# Patient Record
Sex: FEMALE | Race: WHITE | NOT HISPANIC OR LATINO | Employment: FULL TIME | URBAN - METROPOLITAN AREA
[De-identification: names, ages, dates, MRNs, and addresses within clinical notes are randomized per-mention and may not be internally consistent; named-entity substitution may affect disease eponyms.]

---

## 2019-02-01 ENCOUNTER — OFFICE VISIT (OUTPATIENT)
Dept: URGENT CARE | Facility: CLINIC | Age: 59
End: 2019-02-01
Payer: MEDICAID

## 2019-02-01 VITALS
BODY MASS INDEX: 26.66 KG/M2 | SYSTOLIC BLOOD PRESSURE: 99 MMHG | HEART RATE: 67 BPM | WEIGHT: 160 LBS | RESPIRATION RATE: 20 BRPM | TEMPERATURE: 99 F | HEIGHT: 65 IN | DIASTOLIC BLOOD PRESSURE: 56 MMHG | OXYGEN SATURATION: 98 %

## 2019-02-01 DIAGNOSIS — S29.012A UPPER BACK STRAIN, INITIAL ENCOUNTER: Primary | ICD-10-CM

## 2019-02-01 PROCEDURE — 99203 OFFICE O/P NEW LOW 30 MIN: CPT | Mod: S$GLB,,, | Performed by: FAMILY MEDICINE

## 2019-02-01 PROCEDURE — 99203 PR OFFICE/OUTPT VISIT, NEW, LEVL III, 30-44 MIN: ICD-10-PCS | Mod: S$GLB,,, | Performed by: FAMILY MEDICINE

## 2019-02-01 RX ORDER — LEVOTHYROXINE SODIUM 100 UG/1
TABLET ORAL
Refills: 2 | COMMUNITY
Start: 2019-01-02

## 2019-02-01 RX ORDER — ATENOLOL 25 MG/1
TABLET ORAL
Refills: 5 | COMMUNITY
Start: 2019-01-25

## 2019-02-01 RX ORDER — SERTRALINE HYDROCHLORIDE 100 MG/1
TABLET, FILM COATED ORAL
Refills: 2 | COMMUNITY
Start: 2019-01-04

## 2019-02-01 RX ORDER — LEVOTHYROXINE SODIUM 75 UG/1
TABLET ORAL
Refills: 5 | COMMUNITY
Start: 2019-01-02 | End: 2019-02-01

## 2019-02-01 RX ORDER — CYCLOBENZAPRINE HCL 10 MG
TABLET ORAL
Qty: 30 TABLET | Refills: 0 | Status: SHIPPED | OUTPATIENT
Start: 2019-02-01

## 2019-02-01 NOTE — PATIENT INSTRUCTIONS
Back Sprain or Strain    Injury to the muscles (strain) or ligaments (sprain) around the spine can be troubling. Injury may occur after a sudden forceful twisting or bending force such as in a car accident, after a simple awkward movement, or after lifting something heavy with poor body positioning. In any case, muscle spasm is often present and adds to the pain.  Thankfully, most people feel better in 1 to 2 weeks, and most of the rest in 1 to 2 months. Most people can remain active. Unless you had a forceful or traumatic physical injury such as a car accident or fall, X-rays may not be ordered for the first evaluation of a back sprain or strain. If pain continues and does not respond to medical treatment, your healthcare provider may then order X-rays and other tests.  Home care  The following guidelines will help you care for your injury at home:  · When in bed, try to find a comfortable position. A firm mattress is best. Try lying flat on your back with pillows under your knees. You can also try lying on your side with your knees bent up toward your chest and a pillow between your knees.  · Don't sit for long periods. Try not to take long car rides or take other trips that have you sitting for a long time. This puts more stress on the lower back than standing or walking.  · During the first 24 to 72 hours after an injury or flare-up, apply an ice pack to the painful area for 20 minutes. Then remove it for 20 minutes. Do this for 60 to 90 minutes, or several times a day. This will reduce swelling and pain. Be sure to wrap the ice pack in a thin towel or plastic to protect your skin.  · You can start with ice, then switch to heat. Heat from a hot shower, hot bath, or heating pad reduces pain and works well for muscle spasms. Put heat on the painful area for 20 minutes, then remove for 20 minutes. Do this for 60 to 90 minutes, or several times a day. Do not use a heating pad while sleeping. It can burn the  skin.  · You can alternate the ice and heat. Talk with your healthcare provider to find out the best treatment or therapy for your back pain.  · Therapeutic massage will help relax the back muscles without stretching them.  · Be aware of safe lifting methods. Do not lift anything over 15 pounds until all of the pain is gone.  Medicines  Talk to your healthcare provider before using medicines, especially if you have other health problems or are taking other medicines.  · You may use acetaminophen or ibuprofen to control pain, unless another pain medicine was prescribed. If you have chronic conditions like diabetes, liver or kidney disease, stomach ulcers, or gastrointestinal bleeding, or are taking blood-thinner medicines, talk with your doctor before taking any medicines.  · Be careful if you are given prescription medicines, narcotics, or medicine for muscle spasm. They can cause drowsiness, and affect your coordination, reflexes, and judgment. Do not drive or operate heavy machinery when taking these types of medicines. Only take pain medicine as prescribed by your healthcare provider.  Follow-up care  Follow up with your healthcare provider, or as advised. You may need physical therapy or more tests if your symptoms get worse.  If you had X-rays your healthcare provider may be checking for any broken bones, breaks, or fractures. Bruises and sprains can sometimes hurt as much as a fracture. These injuries can take time to heal completely. If your symptoms dont improve or they get worse, talk with your healthcare provider. You may need a repeat X-ray or other tests.  Call 911  Call for emergency care if any of the following occur:  · Trouble breathing  · Confused  · Very drowsy or trouble awakening  · Fainting or loss of consciousness  · Rapid or very slow heart rate  · Loss of bowel or bladder control  When to seek medical advice  Call your healthcare provider right away if any of the following occur:  · Pain  gets worse or spreads to your arms or legs  · Weakness or numbness in one or both arms or legs  · Numbness in the groin or genital area  Date Last Reviewed: 6/1/2016 © 2000-2017 [employer] [facility] [employer]. [address] [place] [address], [place] [address]. All rights reserved. This information is not intended as a substitute for professional medical care. Always follow your healthcare professional's instructions.      TRY IBUPROFEN 400-600 MG 3-4 TIMES DAILY WITH FOOD.    BE AWARE THAT THE MUSCLE RELAXANT, FLEXERIL, CAN MAKE YOU SLEEPY, SO DO NOT TAKE BEFORE DRIVING OR OPERATING HEAVY MACHINERY.    AVOID AGGRAVATING ACTIVITIES.    Make sure that you follow up with your primary care doctor in the next 2-5 days if needed .  Return to the Urgent Care if signs or symptoms change and certainly if you have worsening symptoms go to the nearest emergency department for further evaluation.

## 2020-06-19 ENCOUNTER — OFFICE VISIT (OUTPATIENT)
Dept: URGENT CARE | Facility: CLINIC | Age: 60
End: 2020-06-19
Payer: MEDICAID

## 2020-06-19 VITALS
DIASTOLIC BLOOD PRESSURE: 75 MMHG | OXYGEN SATURATION: 97 % | TEMPERATURE: 97 F | SYSTOLIC BLOOD PRESSURE: 118 MMHG | HEART RATE: 60 BPM

## 2020-06-19 DIAGNOSIS — S61.211A LACERATION OF LEFT INDEX FINGER WITHOUT FOREIGN BODY WITHOUT DAMAGE TO NAIL, INITIAL ENCOUNTER: Primary | ICD-10-CM

## 2020-06-19 PROCEDURE — 99213 OFFICE O/P EST LOW 20 MIN: CPT | Mod: 25,S$GLB,, | Performed by: PHYSICIAN ASSISTANT

## 2020-06-19 PROCEDURE — 90471 IMMUNIZATION ADMIN: CPT | Mod: S$GLB,VFC,, | Performed by: PHYSICIAN ASSISTANT

## 2020-06-19 PROCEDURE — 99213 PR OFFICE/OUTPT VISIT, EST, LEVL III, 20-29 MIN: ICD-10-PCS | Mod: 25,S$GLB,, | Performed by: PHYSICIAN ASSISTANT

## 2020-06-19 PROCEDURE — 90471 TDAP VACCINE GREATER THAN OR EQUAL TO 7YO IM: ICD-10-PCS | Mod: S$GLB,VFC,, | Performed by: PHYSICIAN ASSISTANT

## 2020-06-19 PROCEDURE — 90715 TDAP VACCINE GREATER THAN OR EQUAL TO 7YO IM: ICD-10-PCS | Mod: SL,S$GLB,, | Performed by: PHYSICIAN ASSISTANT

## 2020-06-19 PROCEDURE — 90715 TDAP VACCINE 7 YRS/> IM: CPT | Mod: SL,S$GLB,, | Performed by: PHYSICIAN ASSISTANT

## 2020-06-19 RX ORDER — MUPIROCIN 20 MG/G
OINTMENT TOPICAL 3 TIMES DAILY
Qty: 1 TUBE | Refills: 0 | Status: SHIPPED | OUTPATIENT
Start: 2020-06-19 | End: 2020-06-26

## 2020-06-19 RX ORDER — ACYCLOVIR 400 MG/1
TABLET ORAL
COMMUNITY
Start: 2020-06-07

## 2020-06-19 RX ORDER — CLINDAMYCIN HYDROCHLORIDE 300 MG/1
300 CAPSULE ORAL EVERY 8 HOURS
Qty: 9 CAPSULE | Refills: 0 | Status: SHIPPED | OUTPATIENT
Start: 2020-06-19 | End: 2020-06-22

## 2020-06-19 RX ORDER — DICYCLOMINE HYDROCHLORIDE 20 MG/1
TABLET ORAL
COMMUNITY
Start: 2020-06-05

## 2020-06-19 NOTE — PROGRESS NOTES
Subjective:       Patient ID: May Tomlinson is a 60 y.o. female.    Vitals:  temperature is 97.4 °F (36.3 °C). Her blood pressure is 118/75 and her pulse is 60. Her oxygen saturation is 97%.     Chief Complaint: Laceration    60-year-old female who presents to urgent care clinic for evaluation.  Left distal index Finger cut on yesterday with  around 3-4 p.m..  She cleaned it and managed it.  Bleeding stopped overnight.  There is residual throbbing/tingling pain but no active bleeding.  Denies any focal weakness, deficits, abnormal ROM, or fever, chills, or active drainage.  Unsure of tetanus status.    Laceration   The incident occurred 12 to 24 hours ago. The laceration is located on the left hand. The laceration is 1 cm in size. The laceration mechanism was a metal edge. The pain is at a severity of 3/10. The pain is mild. The pain has been intermittent since onset. Possible foreign bodies include metal. Her tetanus status is unknown.       Constitution: Negative for activity change, appetite change, chills, sweating, fatigue, fever and generalized weakness.   HENT: Negative for ear pain, hearing loss, facial swelling, facial trauma, congestion, postnasal drip, sinus pain, sinus pressure, sore throat, trouble swallowing and voice change.    Neck: Negative for neck pain, neck stiffness and painful lymph nodes.   Cardiovascular: Negative for chest trauma, chest pain, leg swelling, palpitations, sob on exertion and passing out.   Eyes: Negative for eye trauma, eye discharge, eye pain, photophobia, vision loss, double vision and blurred vision.   Respiratory: Negative for chest tightness, cough, sputum production, bloody sputum, COPD, shortness of breath, stridor, wheezing and asthma.    Gastrointestinal: Negative for abdominal trauma, abdominal pain, nausea, vomiting, constipation, diarrhea, bright red blood in stool, rectal bleeding, heartburn and bowel incontinence.   Genitourinary: Negative for  dysuria, frequency, urgency, urine decreased, flank pain, bladder incontinence, hematuria, missed menses, genital trauma and pelvic pain.   Musculoskeletal: Positive for pain. Negative for trauma, joint pain, joint swelling, abnormal ROM of joint, muscle cramps and muscle ache.   Skin: Positive for laceration. Negative for color change, pale, rash, wound, abrasion and bruising.   Allergic/Immunologic: Negative for seasonal allergies, asthma and immunocompromised state.   Neurological: Negative for dizziness, history of vertigo, light-headedness, passing out, facial drooping, speech difficulty, coordination disturbances, loss of balance, headaches, disorientation, altered mental status, loss of consciousness, numbness, tingling and seizures.   Hematologic/Lymphatic: Negative for swollen lymph nodes, easy bruising/bleeding, trouble clotting and history of bleeding disorder. Does not bruise/bleed easily.   Psychiatric/Behavioral: Negative for altered mental status and disorientation.       Objective:      Physical Exam   Constitutional: She is oriented to person, place, and time. She appears well-developed. She is cooperative.  Non-toxic appearance. No distress.   HENT:   Head: Normocephalic and atraumatic.   Right Ear: Hearing, external ear and ear canal normal.   Left Ear: Hearing, external ear and ear canal normal.   Nose: Nose normal.   Mouth/Throat: Uvula is midline, oropharynx is clear and moist and mucous membranes are normal. No posterior oropharyngeal edema or posterior oropharyngeal erythema. No tonsillar exudate.   Eyes: Pupils are equal, round, and reactive to light. Conjunctivae, EOM and lids are normal. Right eye exhibits no discharge. Left eye exhibits no discharge.   Neck: Normal range of motion and full passive range of motion without pain. Neck supple. No neck rigidity.   Cardiovascular: Normal rate, regular rhythm and normal pulses.   Pulmonary/Chest: Effort normal and breath sounds normal. No  accessory muscle usage. No respiratory distress.   Abdominal: Soft. Bowel sounds are normal. She exhibits no distension. There is no abdominal tenderness.   Musculoskeletal: Normal range of motion.      Right lower leg: No edema.      Left lower leg: No edema.      Comments: Moves all extremities with normal tone, strength, and ROM.   Neurological: She is alert and oriented to person, place, and time. She has normal motor skills, normal sensation and intact cranial nerves. She displays no weakness, facial symmetry and normal reflexes. No cranial nerve deficit or sensory deficit. She exhibits normal muscle tone. Gait and coordination normal. Coordination normal.   Skin: Skin is warm, dry and not diaphoretic. Capillary refill takes less than 2 seconds.   Superficial jagged laceration to distal left index finger.  No active bleeding, drainage, or tenderness to palpation.    Comments: Superficial jagged laceration to distal left index finger.  No active bleeding, drainage, or tenderness to palpation.     Psychiatric: Her behavior is normal. Thought content normal.   Nursing note and vitals reviewed.                Assessment:       1. Laceration of left index finger without foreign body without damage to nail, initial encounter          Patient presents with clinical exam findings and history consistent with superficial laceration.  No closure needed.  Patient was given tetanus shot in clinic.  Patient was topical Bactroban, prophylactic clindamycin (allergic to penicillin), and recommended OTC treatments for their symptoms.   strict wound care given verbally and printed.    Patient was instructed to return for re-evaluation for any worsening or change in current symptoms. Strict ED versus clinic precautions given in depth. Discharge and follow-up instructions given verbally/printed with the patient who expressed understanding and willingness to comply with my recommendations.  Patient verbalized understanding and  agreed with the entirety of plan of care.    Plan:         Laceration of left index finger without foreign body without damage to nail, initial encounter  -     (In Office Administered) Tdap Vaccine  -     clindamycin (CLEOCIN) 300 MG capsule; Take 1 capsule (300 mg total) by mouth every 8 (eight) hours. for 3 days  Dispense: 9 capsule; Refill: 0  -     mupirocin (BACTROBAN) 2 % ointment; Apply topically 3 (three) times daily. for 7 days  Dispense: 1 Tube; Refill: 0           Patient Instructions   PLEASE READ YOUR DISCHARGE INSTRUCTIONS ENTIRELY AS IT CONTAINS IMPORTANT INFORMATION.    Please take the antibiotics to completion. Please supplement with OTC probiotics and yogurt.     Use the antibiotic ointment to the wound 2-3x daily as directed.     Please cover your incision for 48-72 hours.  Then keep open to air.    Please return or see your primary care doctor for signs of worsening infection (fever, worsening swelling/redness/pain, inability to move your extremity).        Please arrange follow up with your primary medical clinic as soon as possible. You must understand that you've received an Urgent Care treatment only and that you may be released before all of your medical problems are known or treated. You, the patient, will arrange for follow up as instructed. If your symptoms worsen or fail to improve you should go to the Emergency Room.    WE CANNOT RULE OUT ALL POSSIBLE CAUSES OF YOUR SYMPTOMS IN THE URGENT CARE SETTING PLEASE GO TO THE ER IF YOU FEELS YOUR CONDITION IS WORSENING OR YOU WOULD LIKE EMERGENT EVALUATION.

## 2020-06-19 NOTE — PATIENT INSTRUCTIONS
PLEASE READ YOUR DISCHARGE INSTRUCTIONS ENTIRELY AS IT CONTAINS IMPORTANT INFORMATION.    Please take the antibiotics to completion. Please supplement with OTC probiotics and yogurt.     Use the antibiotic ointment to the wound 2-3x daily as directed.     Please cover your incision for 48-72 hours.  Then keep open to air.    Please return or see your primary care doctor for signs of worsening infection (fever, worsening swelling/redness/pain, inability to move your extremity).        Please arrange follow up with your primary medical clinic as soon as possible. You must understand that you've received an Urgent Care treatment only and that you may be released before all of your medical problems are known or treated. You, the patient, will arrange for follow up as instructed. If your symptoms worsen or fail to improve you should go to the Emergency Room.    WE CANNOT RULE OUT ALL POSSIBLE CAUSES OF YOUR SYMPTOMS IN THE URGENT CARE SETTING PLEASE GO TO THE ER IF YOU FEELS YOUR CONDITION IS WORSENING OR YOU WOULD LIKE EMERGENT EVALUATION.

## 2024-02-14 ENCOUNTER — HOSPITAL ENCOUNTER (EMERGENCY)
Facility: HOSPITAL | Age: 64
Discharge: HOME | End: 2024-02-14
Attending: EMERGENCY MEDICINE
Payer: MEDICAID

## 2024-02-14 VITALS
DIASTOLIC BLOOD PRESSURE: 60 MMHG | SYSTOLIC BLOOD PRESSURE: 124 MMHG | BODY MASS INDEX: 23.32 KG/M2 | TEMPERATURE: 98 F | OXYGEN SATURATION: 98 % | WEIGHT: 140 LBS | HEIGHT: 65 IN | HEART RATE: 70 BPM | RESPIRATION RATE: 18 BRPM

## 2024-02-14 DIAGNOSIS — U07.1 COVID-19: Primary | ICD-10-CM

## 2024-02-14 LAB
ALBUMIN SERPL-MCNC: 4 G/DL (ref 3.3–5.5)
ALP SERPL-CCNC: 80 U/L (ref 42–141)
BILIRUB SERPL-MCNC: 0.6 MG/DL (ref 0.2–1.6)
BUN SERPL-MCNC: 10 MG/DL (ref 7–22)
CALCIUM SERPL-MCNC: 9.6 MG/DL (ref 8–10.3)
CHLORIDE SERPL-SCNC: 109 MMOL/L (ref 98–108)
CREAT SERPL-MCNC: 0.6 MG/DL (ref 0.6–1.2)
CTP QC/QA: YES
GLUCOSE SERPL-MCNC: 131 MG/DL (ref 73–118)
HCT, POC: NORMAL
HGB, POC: NORMAL (ref 14–18)
INFLUENZA A ANTIGEN, POC: NEGATIVE
INFLUENZA B ANTIGEN, POC: NEGATIVE
MCH, POC: NORMAL
MCHC, POC: NORMAL
MCV, POC: NORMAL
MPV, POC: NORMAL
POC ALT (SGPT): 15 U/L (ref 10–47)
POC AST (SGOT): 26 U/L (ref 11–38)
POC PLATELET COUNT: NORMAL
POC TCO2: 24 MMOL/L (ref 18–33)
POTASSIUM BLD-SCNC: 3.6 MMOL/L (ref 3.6–5.1)
PROTEIN, POC: 7.6 G/DL (ref 6.4–8.1)
RBC, POC: NORMAL
RDW, POC: NORMAL
SARS-COV-2 RDRP RESP QL NAA+PROBE: POSITIVE
SODIUM BLD-SCNC: 144 MMOL/L (ref 128–145)
WBC, POC: NORMAL

## 2024-02-14 PROCEDURE — 96375 TX/PRO/DX INJ NEW DRUG ADDON: CPT | Mod: ER

## 2024-02-14 PROCEDURE — 85025 COMPLETE CBC W/AUTO DIFF WBC: CPT | Mod: ER

## 2024-02-14 PROCEDURE — 87635 SARS-COV-2 COVID-19 AMP PRB: CPT | Mod: ER | Performed by: EMERGENCY MEDICINE

## 2024-02-14 PROCEDURE — 99284 EMERGENCY DEPT VISIT MOD MDM: CPT | Mod: 25,ER

## 2024-02-14 PROCEDURE — 96374 THER/PROPH/DIAG INJ IV PUSH: CPT | Mod: ER

## 2024-02-14 PROCEDURE — 63600175 PHARM REV CODE 636 W HCPCS: Mod: ER | Performed by: NURSE PRACTITIONER

## 2024-02-14 PROCEDURE — 87804 INFLUENZA ASSAY W/OPTIC: CPT | Mod: 59,ER

## 2024-02-14 PROCEDURE — 25000003 PHARM REV CODE 250: Mod: ER | Performed by: NURSE PRACTITIONER

## 2024-02-14 PROCEDURE — 96361 HYDRATE IV INFUSION ADD-ON: CPT | Mod: ER

## 2024-02-14 PROCEDURE — 80053 COMPREHEN METABOLIC PANEL: CPT | Mod: ER

## 2024-02-14 RX ORDER — ACETAMINOPHEN 500 MG
1000 TABLET ORAL EVERY 6 HOURS PRN
Qty: 20 TABLET | Refills: 0 | Status: SHIPPED | OUTPATIENT
Start: 2024-02-14

## 2024-02-14 RX ORDER — ONDANSETRON 4 MG/1
4 TABLET, ORALLY DISINTEGRATING ORAL EVERY 6 HOURS PRN
Qty: 10 TABLET | Refills: 0 | Status: SHIPPED | OUTPATIENT
Start: 2024-02-14

## 2024-02-14 RX ORDER — IBUPROFEN 600 MG/1
600 TABLET ORAL EVERY 6 HOURS PRN
Qty: 20 TABLET | Refills: 0 | Status: SHIPPED | OUTPATIENT
Start: 2024-02-14

## 2024-02-14 RX ORDER — ONDANSETRON HYDROCHLORIDE 2 MG/ML
4 INJECTION, SOLUTION INTRAVENOUS
Status: COMPLETED | OUTPATIENT
Start: 2024-02-14 | End: 2024-02-14

## 2024-02-14 RX ORDER — BENZONATATE 100 MG/1
100 CAPSULE ORAL 3 TIMES DAILY PRN
Qty: 20 CAPSULE | Refills: 0 | Status: SHIPPED | OUTPATIENT
Start: 2024-02-14 | End: 2024-02-24

## 2024-02-14 RX ORDER — KETOROLAC TROMETHAMINE 30 MG/ML
15 INJECTION, SOLUTION INTRAMUSCULAR; INTRAVENOUS
Status: COMPLETED | OUTPATIENT
Start: 2024-02-14 | End: 2024-02-14

## 2024-02-14 RX ADMIN — SODIUM CHLORIDE 1000 ML: 9 INJECTION, SOLUTION INTRAVENOUS at 01:02

## 2024-02-14 RX ADMIN — KETOROLAC TROMETHAMINE 15 MG: 30 INJECTION, SOLUTION INTRAMUSCULAR; INTRAVENOUS at 01:02

## 2024-02-14 RX ADMIN — ONDANSETRON 4 MG: 2 INJECTION INTRAMUSCULAR; INTRAVENOUS at 01:02

## 2024-02-14 NOTE — DISCHARGE INSTRUCTIONS
Thank you for coming to our Emergency Department today. It is important to remember that some problems or medical conditions are difficult to diagnose and may not be found during your Emergency Department visit.     Be sure to follow up with your primary care doctor and review all labs/imaging/tests that were performed during your ER visit with them. Some labs/tests may be outside of the normal range and require non-emergent follow-up and further investigation to help diagnose/exclude/prevent complications or other potentially serious medical conditions that were not addressed during your ER visit.    If you do not have a primary care doctor, you may contact the one listed on your discharge paperwork or you may also call the [facility] Appointment Desk at [phone] to schedule an appointment and establish care with one. It is important to your health that you have a primary care doctor.    Please take all medications as directed. All medications may potentially have side-effects and it is impossible to predict which medications may give you side-effects or what side-effects (if any) they will give you.. If you feel that you are having a negative effect or side-effect of any medication you should immediately stop taking them and seek medical attention. If you feel that you are having a life-threatening reaction call 911.    Return to the ER with any questions/concerns, new/concerning symptoms, worsening or failure to improve.     Do not drive, swim, climb to height, take a bath, operate heavy machinery, drink alcohol or take potentially sedating medications, sign any legal documents or make any important decisions for 24 hours if you have received any pain medications, sedatives or mood altering drugs during your ER visit or within 24 hours of taking them if they have been prescribed to you.     You can find additional resources for Dentists, hearing aids, durable medical equipment, low cost pharmacies and  other resources at [email or web address]    BELOW THIS LINE ONLY APPLIES IF YOU HAVE A COVID TEST PENDING OR IF YOU HAVE BEEN DIAGNOSED WITH COVID:  Please access [facility] to review the results of your test. Until the results of your COVID test return, you should isolate yourself so as not to potentially spread illness to others.   If your COVID test returns positive, you should isolate yourself so as not to spread illness to others. After five full days, if you are feeling better and you have not had fever for 24 hours, you can return to your typical daily activities, but you must wear a mask around others for an additional 5 days.   If your COVID test returns negative and you are either unvaccinated or more than six months out from your two-dose vaccine and are not yet boosted, you should still quarantine for 5 full days followed by strict mask use for an additional 5 full days.   If your COVID test returns negative and you have received your 2-dose initial vaccine as well as a booster, you should continue strict mask use for 10 full days after the exposure.  For all those exposed, best practice includes a test at day 5 after the exposure. This can be a home test or a test through one of the many testing centers throughout our community.   Masking is always advised to limit the spread of COVID. Cdc.gov is an excellent site to obtain the latest up to date recommendations regarding COVID and COVID testing.     CDC Testing and Quarantine Guidelines for patients with exposure to a known-positive COVID-19 person:  A close exposure is defined as anyone who has had an exposure (masked or unmasked) to a known COVID -19 positive person within 6 feet of someone for a cumulative total of 15 minutes or more over a 24-hour period.   Vaccinated and/or if you recently had a positive covid test within 90 days do NOT need to quarantine after contact with someone who had COVID-19 unless you develop symptoms.   Fully vaccinated  people who have not had a positive test within 90 days, should get tested 3-5 days after their exposure, even if they don't have symptoms and wear a mask indoors in public for 14 days following exposure or until their test result is negative.      Unvaccinated and/or NOT had a positive test within 90 days and meet close exposure  You are required by CDC guidelines to quarantine for at least 5 days from time of exposure followed by 5 days of strict masking. It is recommended, but not required to test after 5 days, unless you develop symptoms, in which case you should test at that time.  If you get tested after 5 days and your test is positive, your 5 day period of isolation starts the day of the positive test.    If your exposure does not meet the above definition, you can return to your normal daily activities to include social distancing, wearing a mask and frequent handwashing.      Here is a link to guidance from the CDC:  https://www.cdc.gov/media/releases/2021/s1227-isolation-quarantine-guidance.html      [place] Dept Of Health Testing Sites:  https://ldh.la.gov/page/3934      [facility] website with testing locations and guidance:  [email or web address]

## 2024-02-14 NOTE — ED PROVIDER NOTES
Encounter Date: 2/14/2024    SCRIBE #1 NOTE: I, Kim De La Cruz, am scribing for, and in the presence of,  Kelsea España NP. I have scribed the following portions of the note - Other sections scribed: HPI,YAHAIRA.       History     Chief Complaint   Patient presents with    URI     Pt complains of headache, generalized body aches, and fever since Sunday. Denies taking otc medications. Adds nausea and 1 episode of diarrhea.      May Tomlinson is a 63 y.o. female, with no pertinent PMHx , who presents to the ED with worsening generalized myalgias that began 3 days ago . Patient reports associated symptoms of headache, nausea, subjective fever, lightheadedness, weakness, and diarrhea (1x this AM). Patient attempted treatment with tylenol but symptoms persisted. No other exacerbating or alleviating factors. Patient states she ate chicken broth today but hasn't intake much fluids recently. Patient denies any sick contacts. Denies cough, congestion, rhinorrhea, sore throat, chest pain, abdominal pain, vomiting, dysuria, urinary/bowel incontinence, or other associated symptoms.       The history is provided by the patient. No  was used.     Review of patient's allergies indicates:   Allergen Reactions    Menthol contain prod Hives    Penicillins Rash     Had childhood reaction    Tramadol Nausea And Vomiting     No past medical history on file.  No past surgical history on file.  No family history on file.  Social History     Tobacco Use    Smoking status: Never     Review of Systems   Constitutional:  Positive for fever (subjective). Negative for chills.   HENT:  Negative for congestion and sore throat.    Eyes:  Negative for pain.   Respiratory:  Negative for cough, shortness of breath and wheezing.    Cardiovascular:  Negative for chest pain.   Gastrointestinal:  Positive for diarrhea and nausea. Negative for abdominal pain and vomiting.   Genitourinary:  Negative for dysuria and flank pain.         - urinary/bowel incontinence    Musculoskeletal:  Negative for myalgias (generalized).   Skin:  Negative for color change.   Neurological:  Positive for weakness, light-headedness and headaches.   Hematological:  Does not bruise/bleed easily.   Psychiatric/Behavioral:  Negative for suicidal ideas.        Physical Exam     Initial Vitals [02/14/24 1155]   BP Pulse Resp Temp SpO2   (!) 107/52 85 18 98.2 °F (36.8 °C) 99 %      MAP       --         Physical Exam    Constitutional: She appears well-developed and well-nourished. She is not diaphoretic. No distress.   HENT:   Head: Normocephalic and atraumatic.   Right Ear: External ear normal.   Left Ear: External ear normal.   Eyes: Conjunctivae and EOM are normal. Pupils are equal, round, and reactive to light.   Neck:   Normal range of motion.  Cardiovascular:  Normal rate, regular rhythm, normal heart sounds and intact distal pulses.           Pulmonary/Chest: No respiratory distress.   Abdominal: Abdomen is soft. There is no abdominal tenderness.   Musculoskeletal:         General: Normal range of motion.      Cervical back: Normal range of motion.     Neurological: She is alert and oriented to person, place, and time.   Skin: Skin is warm and dry.   Psychiatric: She has a normal mood and affect. Her behavior is normal.         ED Course   Procedures  Labs Reviewed   SARS-COV-2 RDRP GENE - Abnormal; Notable for the following components:       Result Value    POC Rapid COVID Positive (*)     All other components within normal limits    Narrative:     This test utilizes isothermal nucleic acid amplification technology to detect the SARS-CoV-2 RdRp nucleic acid segment. The analytical sensitivity (limit of detection) is 500 copies/swab.     A POSITIVE result is indicative of the presence of SARS-CoV-2 RNA; clinical correlation with patient history and other diagnostic information is necessary to determine patient infection status.    A NEGATIVE result means that  SARS-CoV-2 nucleic acids are not present above the limit of detection. A NEGATIVE result should be treated as presumptive. It does not rule out the possibility of COVID-19 and should not be the sole basis for treatment decisions. If COVID-19 is strongly suspected based on clinical and exposure history, re-testing using an alternate molecular assay should be considered.     Commercial kits are provided by [employer].   _________________________________________________________________   The authorized Fact Sheet for Healthcare Providers and the authorized Fact Sheet for Patients of the ID NOW COVID-19 are available on the FDA website:    https://www.fda.gov/media/834350/download      https://www.fda.gov/media/236530/download      POCT CMP - Abnormal; Notable for the following components:    POC Chloride 109 (*)     POC Glucose 131 (*)     All other components within normal limits   POCT CBC   POCT INFLUENZA A/B MOLECULAR   POCT RAPID INFLUENZA A/B   POCT CMP          Imaging Results    None          Medications   sodium chloride 0.9% bolus 1,000 mL 1,000 mL (0 mLs Intravenous Stopped 2/14/24 1428)   ondansetron injection 4 mg (4 mg Intravenous Given 2/14/24 1335)   ketorolac injection 15 mg (15 mg Intravenous Given 2/14/24 1336)     Medical Decision Making  63-year-old female presenting to the ED with headache, generalized myalgias, nausea, diarrhea, fatigue.  Differentials include viral illness, strep pharyngitis, otitis media, pneumonia, cholecystitis, diverticulitis, UTI, others.  Full physical exam as above.  Breath sounds are clear.  Abdominal exam is benign without tenderness, guarding, rigidity.  Flu is negative.  COVID is positive.  IV placed.  Given fluids, Toradol, Zofran with improvement in symptoms.  On repeat assessment, patient is feeling improved.  She is tolerating oral intake.  Will discharge home with symptomatic relief.  She is within the window for treatment with Paxlovid.  Given patient  fact sheet as well as list of potential drug interactions.  Return precautions discussed with the patient verbalized understanding.  She is agreeable to the plan of care.    Based on my clinical evaluation, I do not appreciate any immediate, emergent, or life threatening condition or etiology that warrants additional workup today.  I feel the patient can be discharged with close follow-up care.     Amount and/or Complexity of Data Reviewed  Labs: ordered. Decision-making details documented in ED Course.    Risk  OTC drugs.  Prescription drug management.            Scribe Attestation:   Scribe #1: I performed the above scribed service and the documentation accurately describes the services I performed. I attest to the accuracy of the note.        ED Course as of 02/14/24 1643   Wed Feb 14, 2024   1309 SARS-CoV-2 RNA, Amplification, Qual(!): Positive [MM]      ED Course User Index  [MM] Kelsea España NP I, M Truxillo, personally performed the services described in this documentation. All medical record entries made by the scribe were at my direction and in my presence. I have reviewed the chart and agree that the record reflects my personal performance and is accurate and complete.      Clinical Impression:  Final diagnoses:  [U07.1] COVID-19 (Primary)          ED Disposition Condition    Discharge Stable          ED Prescriptions       Medication Sig Dispense Start Date End Date Auth. Provider    nirmatrelvir-ritonavir 300 mg (150 mg x 2)-100 mg copackaged tablets (EUA) Take 3 tablets by mouth 2 (two) times daily for 5 days. Each dose contains 2 nirmatrelvir (pink tablets) and 1 ritonavir (white tablet). Take all 3 tablets together 30 tablet 2/14/2024 2/19/2024 Kelsea España NP    benzonatate (TESSALON) 100 MG capsule Take 1 capsule (100 mg total) by mouth 3 (three) times daily as needed for Cough. 20 capsule 2/14/2024 2/24/2024 Kelsea España NP    ondansetron (ZOFRAN-ODT) 4 MG TbDL Take 1  tablet (4 mg total) by mouth every 6 (six) hours as needed (nausea). 10 tablet 2/14/2024 -- Kelsea España NP    ibuprofen (ADVIL,MOTRIN) 600 MG tablet Take 1 tablet (600 mg total) by mouth every 6 (six) hours as needed for Pain. 20 tablet 2/14/2024 -- Kelsea España NP    acetaminophen (TYLENOL) 500 MG tablet Take 2 tablets (1,000 mg total) by mouth every 6 (six) hours as needed for Pain or Temperature greater than (100.4). 20 tablet 2/14/2024 -- Kelsea España NP          Follow-up Information       Follow up With Specialties Details Why Contact Info    Samson Hollis MD Pediatrics Schedule an appointment as soon as possible for a visit  For follow-up [address] [facility] [place] [address]  [phone]      [facility] ED Emergency Medicine Go to  If symptoms worsen [address] [facility] [id number] [phone]             Kelsea España NP  02/14/24 4307